# Patient Record
Sex: MALE | Race: WHITE | NOT HISPANIC OR LATINO | Employment: FULL TIME | URBAN - METROPOLITAN AREA
[De-identification: names, ages, dates, MRNs, and addresses within clinical notes are randomized per-mention and may not be internally consistent; named-entity substitution may affect disease eponyms.]

---

## 2019-10-14 ENCOUNTER — TELEPHONE (OUTPATIENT)
Dept: FAMILY MEDICINE CLINIC | Facility: CLINIC | Age: 49
End: 2019-10-14

## 2019-10-14 NOTE — TELEPHONE ENCOUNTER
He will have to be seen to get any kind of referral since it is been over 3 years  He does not have to be booked as a new patient, we just need to find him an appropriate slot and get him seen

## 2019-10-15 NOTE — TELEPHONE ENCOUNTER
Left detailed vm for pt that he needs an appointment for any referrals since he hasn't been here in over 3 years. Scheduling number given to schedule.

## 2019-11-17 ENCOUNTER — HOSPITAL ENCOUNTER (EMERGENCY)
Facility: HOSPITAL | Age: 49
Discharge: HOME | End: 2019-11-17
Attending: EMERGENCY MEDICINE | Admitting: EMERGENCY MEDICINE

## 2019-11-17 VITALS
BODY MASS INDEX: 34.84 KG/M2 | DIASTOLIC BLOOD PRESSURE: 106 MMHG | OXYGEN SATURATION: 98 % | HEIGHT: 67 IN | WEIGHT: 222 LBS | RESPIRATION RATE: 16 BRPM | HEART RATE: 84 BPM | SYSTOLIC BLOOD PRESSURE: 172 MMHG | TEMPERATURE: 98.2 F

## 2019-11-17 DIAGNOSIS — I10 ESSENTIAL HYPERTENSION: ICD-10-CM

## 2019-11-17 DIAGNOSIS — M70.22 OLECRANON BURSITIS OF LEFT ELBOW: Primary | ICD-10-CM

## 2019-11-17 PROCEDURE — 87070 CULTURE OTHR SPECIMN AEROBIC: CPT | Performed by: EMERGENCY MEDICINE

## 2019-11-17 PROCEDURE — 99283 EMERGENCY DEPT VISIT LOW MDM: CPT

## 2019-11-17 PROCEDURE — 25010000003 LIDOCAINE 1 % SOLUTION

## 2019-11-17 PROCEDURE — 87205 SMEAR GRAM STAIN: CPT | Performed by: EMERGENCY MEDICINE

## 2019-11-17 RX ORDER — CEPHALEXIN 500 MG/1
500 CAPSULE ORAL EVERY 8 HOURS SCHEDULED
Status: DISCONTINUED | OUTPATIENT
Start: 2019-11-17 | End: 2019-11-18 | Stop reason: HOSPADM

## 2019-11-17 RX ORDER — LISINOPRIL 5 MG/1
10 TABLET ORAL
Status: COMPLETED | OUTPATIENT
Start: 2019-11-17 | End: 2019-11-17

## 2019-11-17 RX ORDER — LISINOPRIL 10 MG/1
10 TABLET ORAL DAILY
Qty: 10 TABLET | Refills: 0 | Status: SHIPPED | OUTPATIENT
Start: 2019-11-17 | End: 2019-11-27

## 2019-11-17 RX ADMIN — CEPHALEXIN 500 MG: 500 CAPSULE ORAL at 22:36

## 2019-11-17 RX ADMIN — LISINOPRIL 10 MG: 5 TABLET ORAL at 22:36

## 2019-11-18 NOTE — DISCHARGE INSTRUCTIONS
Lisinopril daily every morning.  Keflex 3 times a day for  7 days.  Keep elbow wrapped with a compression dressing this week and no strenuous activity involving the elbow.  Follow-up with your doctor on Friday as planned.  You will be notified if your cultures are positive

## 2019-11-18 NOTE — ED PROVIDER NOTES
Subjective   History of Present Illness  Patient developed pain and swelling over the left olecranon bursa approximately 6 hours ago.  He states that it is gradually increased.  He is not able to recall any direct blow to the elbow but does recall a torquing type of action.  Denies any fever or chills.  Review of Systems    Past Medical History:   Diagnosis Date   • Hyperlipidemia        No Known Allergies    Past Surgical History:   Procedure Laterality Date   • HERNIA REPAIR         History reviewed. No pertinent family history.    Social History     Socioeconomic History   • Marital status:      Spouse name: Not on file   • Number of children: Not on file   • Years of education: Not on file   • Highest education level: Not on file   Tobacco Use   • Smoking status: Former Smoker   • Smokeless tobacco: Never Used   • Tobacco comment: quit 2003   Substance and Sexual Activity   • Alcohol use: Yes     Comment: social   • Drug use: No           Objective   Physical Exam  There is no fever the patient has swelling of the olecranon bursa it is not red but it is tender and there is palpable fluid in the olecranon bursa.  Procedures     The patient had the olecranon bursa cleansed and prepped with Betadine.  The patient had a small amount of Xylocaine placed into the skin with a 27-gauge needle and then the olecranon bursa was aspirated using a 19-gauge needle and 2 cc of straw-colored fluid was removed.  This was sent for Gram stain.  Gram stain showed few gram-positive cocci      ED Course  ED Course as of Nov 17 2240   Sun Nov 17, 2019 2239 Gram Stain: Few (2+) Gram positive diplococci [RG]      ED Course User Index  [RG] Amandeep Cruz MD                  [facility]  It was elected to start the patient on Keflex with the possibility of this being an early septic bursitis.  The patient had a dressing placed over the puncture site and then an Ace wrap was applied.  He is to keep the area wrapped and is to  minimize any activity using the left elbow this week.  He will need to follow-up with his doctor and if his cultures turn out positive then he will be notified.  The patient's blood pressure was rechecked and it was 178/103.  The patient will be started on lisinopril and he has an appointment to see his doctor this coming week.  The patient was also given a 2-day supply of Norco for pain as needed.  Because of his elevated blood pressure chose not to use anti-inflammatories.  Final diagnoses:   Olecranon bursitis of left elbow   Essential hypertension              Amandeep Cruz MD  11/17/19 2239       Amandeep Cruz MD  11/17/19 5295

## 2019-11-22 LAB
BACTERIA SPEC AEROBE CULT: NORMAL
GRAM STN SPEC: NORMAL

## 2021-03-22 ENCOUNTER — OFFICE (AMBULATORY)
Dept: URBAN - METROPOLITAN AREA PATHOLOGY 4 | Facility: PATHOLOGY | Age: 51
End: 2021-03-22
Payer: COMMERCIAL

## 2021-03-22 ENCOUNTER — ON CAMPUS - OUTPATIENT (AMBULATORY)
Dept: URBAN - METROPOLITAN AREA HOSPITAL 2 | Facility: HOSPITAL | Age: 51
End: 2021-03-22
Payer: COMMERCIAL

## 2021-03-22 VITALS
HEART RATE: 67 BPM | OXYGEN SATURATION: 99 % | WEIGHT: 201 LBS | TEMPERATURE: 96.9 F | SYSTOLIC BLOOD PRESSURE: 125 MMHG | SYSTOLIC BLOOD PRESSURE: 131 MMHG | OXYGEN SATURATION: 96 % | SYSTOLIC BLOOD PRESSURE: 111 MMHG | DIASTOLIC BLOOD PRESSURE: 78 MMHG | SYSTOLIC BLOOD PRESSURE: 106 MMHG | HEART RATE: 73 BPM | RESPIRATION RATE: 18 BRPM | SYSTOLIC BLOOD PRESSURE: 135 MMHG | OXYGEN SATURATION: 100 % | DIASTOLIC BLOOD PRESSURE: 69 MMHG | SYSTOLIC BLOOD PRESSURE: 122 MMHG | DIASTOLIC BLOOD PRESSURE: 61 MMHG | HEIGHT: 67 IN | DIASTOLIC BLOOD PRESSURE: 65 MMHG | SYSTOLIC BLOOD PRESSURE: 119 MMHG | HEART RATE: 77 BPM | DIASTOLIC BLOOD PRESSURE: 62 MMHG | RESPIRATION RATE: 16 BRPM | SYSTOLIC BLOOD PRESSURE: 113 MMHG | HEART RATE: 75 BPM | HEART RATE: 63 BPM | OXYGEN SATURATION: 97 % | DIASTOLIC BLOOD PRESSURE: 75 MMHG | HEART RATE: 68 BPM | DIASTOLIC BLOOD PRESSURE: 77 MMHG | OXYGEN SATURATION: 98 % | HEART RATE: 65 BPM

## 2021-03-22 DIAGNOSIS — D12.2 BENIGN NEOPLASM OF ASCENDING COLON: ICD-10-CM

## 2021-03-22 DIAGNOSIS — K63.5 POLYP OF COLON: ICD-10-CM

## 2021-03-22 DIAGNOSIS — Z86.010 PERSONAL HISTORY OF COLONIC POLYPS: ICD-10-CM

## 2021-03-22 PROBLEM — Z12.11 SCREENING FOR MALIGNANT NEOPLASMS OF COLON: Status: ACTIVE | Noted: 2021-03-22

## 2021-03-22 LAB
GI HISTOLOGY: A. UNSPECIFIED: (no result)
GI HISTOLOGY: B. UNSPECIFIED: (no result)
GI HISTOLOGY: PDF REPORT: (no result)

## 2021-03-22 PROCEDURE — 45385 COLONOSCOPY W/LESION REMOVAL: CPT | Mod: 33 | Performed by: INTERNAL MEDICINE

## 2021-03-22 PROCEDURE — 88305 TISSUE EXAM BY PATHOLOGIST: CPT | Mod: 33 | Performed by: INTERNAL MEDICINE

## 2024-01-11 ENCOUNTER — APPOINTMENT (OUTPATIENT)
Dept: GENERAL RADIOLOGY | Facility: HOSPITAL | Age: 54
End: 2024-01-11
Payer: COMMERCIAL

## 2024-01-11 ENCOUNTER — HOSPITAL ENCOUNTER (OUTPATIENT)
Facility: HOSPITAL | Age: 54
Setting detail: OBSERVATION
Discharge: HOME | End: 2024-01-12
Attending: EMERGENCY MEDICINE | Admitting: EMERGENCY MEDICINE
Payer: COMMERCIAL

## 2024-01-11 DIAGNOSIS — R07.9 CHEST PAIN, UNSPECIFIED TYPE: Primary | ICD-10-CM

## 2024-01-11 LAB
ALBUMIN SERPL-MCNC: 4.7 G/DL (ref 3.5–5.2)
ALBUMIN/GLOB SERPL: 1.7 G/DL
ALP SERPL-CCNC: 63 U/L (ref 39–117)
ALT SERPL W P-5'-P-CCNC: 48 U/L (ref 1–41)
ANION GAP SERPL CALCULATED.3IONS-SCNC: 11 MMOL/L (ref 5–15)
AST SERPL-CCNC: 35 U/L (ref 1–40)
BASOPHILS # BLD AUTO: 0 10*3/MM3 (ref 0–0.2)
BASOPHILS NFR BLD AUTO: 1 % (ref 0–1.5)
BILIRUB SERPL-MCNC: 0.6 MG/DL (ref 0–1.2)
BUN SERPL-MCNC: 16 MG/DL (ref 6–20)
BUN/CREAT SERPL: 16.3 (ref 7–25)
CALCIUM SPEC-SCNC: 9.5 MG/DL (ref 8.6–10.5)
CHLORIDE SERPL-SCNC: 103 MMOL/L (ref 98–107)
CHOLEST SERPL-MCNC: 130 MG/DL (ref 0–200)
CO2 SERPL-SCNC: 23 MMOL/L (ref 22–29)
CREAT SERPL-MCNC: 0.98 MG/DL (ref 0.76–1.27)
DEPRECATED RDW RBC AUTO: 41.1 FL (ref 37–54)
EGFRCR SERPLBLD CKD-EPI 2021: 91.1 ML/MIN/1.73
EOSINOPHIL # BLD AUTO: 0.3 10*3/MM3 (ref 0–0.4)
EOSINOPHIL NFR BLD AUTO: 5.5 % (ref 0.3–6.2)
ERYTHROCYTE [DISTWIDTH] IN BLOOD BY AUTOMATED COUNT: 12.9 % (ref 12.3–15.4)
GEN 5 2HR TROPONIN T REFLEX: 13 NG/L
GLOBULIN UR ELPH-MCNC: 2.7 GM/DL
GLUCOSE SERPL-MCNC: 102 MG/DL (ref 65–99)
HCT VFR BLD AUTO: 43.2 % (ref 37.5–51)
HDLC SERPL-MCNC: 34 MG/DL (ref 40–60)
HGB BLD-MCNC: 14.8 G/DL (ref 13–17.7)
LDLC SERPL CALC-MCNC: 78 MG/DL (ref 0–100)
LDLC/HDLC SERPL: 2.28 {RATIO}
LYMPHOCYTES # BLD AUTO: 1.8 10*3/MM3 (ref 0.7–3.1)
LYMPHOCYTES NFR BLD AUTO: 39.1 % (ref 19.6–45.3)
MCH RBC QN AUTO: 31.9 PG (ref 26.6–33)
MCHC RBC AUTO-ENTMCNC: 34.3 G/DL (ref 31.5–35.7)
MCV RBC AUTO: 92.8 FL (ref 79–97)
MONOCYTES # BLD AUTO: 0.5 10*3/MM3 (ref 0.1–0.9)
MONOCYTES NFR BLD AUTO: 9.8 % (ref 5–12)
NEUTROPHILS NFR BLD AUTO: 2.1 10*3/MM3 (ref 1.7–7)
NEUTROPHILS NFR BLD AUTO: 44.6 % (ref 42.7–76)
NRBC BLD AUTO-RTO: 0 /100 WBC (ref 0–0.2)
PLATELET # BLD AUTO: 271 10*3/MM3 (ref 140–450)
PMV BLD AUTO: 7.8 FL (ref 6–12)
POTASSIUM SERPL-SCNC: 3.8 MMOL/L (ref 3.5–5.2)
PROT SERPL-MCNC: 7.4 G/DL (ref 6–8.5)
RBC # BLD AUTO: 4.66 10*6/MM3 (ref 4.14–5.8)
SODIUM SERPL-SCNC: 137 MMOL/L (ref 136–145)
TRIGL SERPL-MCNC: 93 MG/DL (ref 0–150)
TROPONIN T DELTA: 0 NG/L
TROPONIN T SERPL HS-MCNC: 13 NG/L
VLDLC SERPL-MCNC: 18 MG/DL (ref 5–40)
WBC NRBC COR # BLD AUTO: 4.7 10*3/MM3 (ref 3.4–10.8)

## 2024-01-11 PROCEDURE — 96372 THER/PROPH/DIAG INJ SC/IM: CPT

## 2024-01-11 PROCEDURE — 71046 X-RAY EXAM CHEST 2 VIEWS: CPT

## 2024-01-11 PROCEDURE — G0378 HOSPITAL OBSERVATION PER HR: HCPCS

## 2024-01-11 PROCEDURE — 99284 EMERGENCY DEPT VISIT MOD MDM: CPT

## 2024-01-11 PROCEDURE — 80061 LIPID PANEL: CPT | Performed by: PHYSICIAN ASSISTANT

## 2024-01-11 PROCEDURE — 25010000002 ENOXAPARIN PER 10 MG: Performed by: PHYSICIAN ASSISTANT

## 2024-01-11 PROCEDURE — 80053 COMPREHEN METABOLIC PANEL: CPT | Performed by: EMERGENCY MEDICINE

## 2024-01-11 PROCEDURE — 84484 ASSAY OF TROPONIN QUANT: CPT | Performed by: EMERGENCY MEDICINE

## 2024-01-11 PROCEDURE — 36415 COLL VENOUS BLD VENIPUNCTURE: CPT

## 2024-01-11 PROCEDURE — 93005 ELECTROCARDIOGRAM TRACING: CPT

## 2024-01-11 PROCEDURE — 93005 ELECTROCARDIOGRAM TRACING: CPT | Performed by: EMERGENCY MEDICINE

## 2024-01-11 PROCEDURE — 85025 COMPLETE CBC W/AUTO DIFF WBC: CPT | Performed by: EMERGENCY MEDICINE

## 2024-01-11 RX ORDER — BISACODYL 5 MG/1
5 TABLET, DELAYED RELEASE ORAL DAILY PRN
Status: DISCONTINUED | OUTPATIENT
Start: 2024-01-11 | End: 2024-01-12 | Stop reason: HOSPADM

## 2024-01-11 RX ORDER — ATORVASTATIN CALCIUM 20 MG/1
20 TABLET, FILM COATED ORAL NIGHTLY
Status: DISCONTINUED | OUTPATIENT
Start: 2024-01-11 | End: 2024-01-12 | Stop reason: HOSPADM

## 2024-01-11 RX ORDER — NITROGLYCERIN 0.4 MG/1
0.4 TABLET SUBLINGUAL
Status: DISCONTINUED | OUTPATIENT
Start: 2024-01-11 | End: 2024-01-12 | Stop reason: HOSPADM

## 2024-01-11 RX ORDER — ALUMINA, MAGNESIA, AND SIMETHICONE 2400; 2400; 240 MG/30ML; MG/30ML; MG/30ML
15 SUSPENSION ORAL EVERY 6 HOURS PRN
Status: DISCONTINUED | OUTPATIENT
Start: 2024-01-11 | End: 2024-01-12 | Stop reason: HOSPADM

## 2024-01-11 RX ORDER — ASPIRIN 325 MG
325 TABLET ORAL ONCE
Status: COMPLETED | OUTPATIENT
Start: 2024-01-11 | End: 2024-01-11

## 2024-01-11 RX ORDER — CHOLECALCIFEROL (VITAMIN D3) 125 MCG
5 CAPSULE ORAL NIGHTLY PRN
Status: DISCONTINUED | OUTPATIENT
Start: 2024-01-11 | End: 2024-01-12 | Stop reason: HOSPADM

## 2024-01-11 RX ORDER — ACETAMINOPHEN 160 MG/5ML
650 SOLUTION ORAL EVERY 4 HOURS PRN
Status: DISCONTINUED | OUTPATIENT
Start: 2024-01-11 | End: 2024-01-12 | Stop reason: HOSPADM

## 2024-01-11 RX ORDER — LISINOPRIL 20 MG/1
20 TABLET ORAL DAILY
Status: DISCONTINUED | OUTPATIENT
Start: 2024-01-12 | End: 2024-01-12 | Stop reason: HOSPADM

## 2024-01-11 RX ORDER — AMOXICILLIN 250 MG
2 CAPSULE ORAL 2 TIMES DAILY
Status: DISCONTINUED | OUTPATIENT
Start: 2024-01-11 | End: 2024-01-12 | Stop reason: HOSPADM

## 2024-01-11 RX ORDER — ONDANSETRON 4 MG/1
4 TABLET, ORALLY DISINTEGRATING ORAL EVERY 6 HOURS PRN
Status: DISCONTINUED | OUTPATIENT
Start: 2024-01-11 | End: 2024-01-12 | Stop reason: HOSPADM

## 2024-01-11 RX ORDER — SODIUM CHLORIDE 0.9 % (FLUSH) 0.9 %
10 SYRINGE (ML) INJECTION AS NEEDED
Status: DISCONTINUED | OUTPATIENT
Start: 2024-01-11 | End: 2024-01-12 | Stop reason: HOSPADM

## 2024-01-11 RX ORDER — SODIUM CHLORIDE 9 MG/ML
40 INJECTION, SOLUTION INTRAVENOUS AS NEEDED
Status: DISCONTINUED | OUTPATIENT
Start: 2024-01-11 | End: 2024-01-12 | Stop reason: HOSPADM

## 2024-01-11 RX ORDER — ACETAMINOPHEN 650 MG/1
650 SUPPOSITORY RECTAL EVERY 4 HOURS PRN
Status: DISCONTINUED | OUTPATIENT
Start: 2024-01-11 | End: 2024-01-12 | Stop reason: HOSPADM

## 2024-01-11 RX ORDER — ASPIRIN 81 MG/1
81 TABLET, CHEWABLE ORAL DAILY
Status: DISCONTINUED | OUTPATIENT
Start: 2024-01-12 | End: 2024-01-12 | Stop reason: HOSPADM

## 2024-01-11 RX ORDER — ONDANSETRON 2 MG/ML
4 INJECTION INTRAMUSCULAR; INTRAVENOUS EVERY 6 HOURS PRN
Status: DISCONTINUED | OUTPATIENT
Start: 2024-01-11 | End: 2024-01-12 | Stop reason: HOSPADM

## 2024-01-11 RX ORDER — ENOXAPARIN SODIUM 100 MG/ML
40 INJECTION SUBCUTANEOUS
Status: DISCONTINUED | OUTPATIENT
Start: 2024-01-11 | End: 2024-01-12 | Stop reason: HOSPADM

## 2024-01-11 RX ORDER — TAMSULOSIN HYDROCHLORIDE 0.4 MG/1
1 CAPSULE ORAL
COMMUNITY

## 2024-01-11 RX ORDER — FENOFIBRATE 145 MG/1
145 TABLET, COATED ORAL DAILY
Status: DISCONTINUED | OUTPATIENT
Start: 2024-01-12 | End: 2024-01-12 | Stop reason: HOSPADM

## 2024-01-11 RX ORDER — ACETAMINOPHEN 325 MG/1
650 TABLET ORAL EVERY 4 HOURS PRN
Status: DISCONTINUED | OUTPATIENT
Start: 2024-01-11 | End: 2024-01-12 | Stop reason: HOSPADM

## 2024-01-11 RX ORDER — TAMSULOSIN HYDROCHLORIDE 0.4 MG/1
0.4 CAPSULE ORAL DAILY
Status: DISCONTINUED | OUTPATIENT
Start: 2024-01-12 | End: 2024-01-12 | Stop reason: HOSPADM

## 2024-01-11 RX ORDER — BISACODYL 10 MG
10 SUPPOSITORY, RECTAL RECTAL DAILY PRN
Status: DISCONTINUED | OUTPATIENT
Start: 2024-01-11 | End: 2024-01-12 | Stop reason: HOSPADM

## 2024-01-11 RX ORDER — SODIUM CHLORIDE 0.9 % (FLUSH) 0.9 %
10 SYRINGE (ML) INJECTION EVERY 12 HOURS SCHEDULED
Status: DISCONTINUED | OUTPATIENT
Start: 2024-01-11 | End: 2024-01-12 | Stop reason: HOSPADM

## 2024-01-11 RX ORDER — POLYETHYLENE GLYCOL 3350 17 G/17G
17 POWDER, FOR SOLUTION ORAL DAILY PRN
Status: DISCONTINUED | OUTPATIENT
Start: 2024-01-11 | End: 2024-01-12 | Stop reason: HOSPADM

## 2024-01-11 RX ADMIN — Medication 10 ML: at 21:37

## 2024-01-11 RX ADMIN — ASPIRIN 325 MG ORAL TABLET 325 MG: 325 PILL ORAL at 13:40

## 2024-01-11 RX ADMIN — ENOXAPARIN SODIUM 40 MG: 100 INJECTION SUBCUTANEOUS at 17:16

## 2024-01-11 NOTE — H&P
"[facility] H&P    Patient Name: Pedro Meyers  : 1970  MRN: [id number]  Primary Care Physician: Michele Alex MD  Date of admission: 2024     Patient Care Team:  Michele Alex MD as PCP - General          Subjective   History Present Illness     Chief Complaint:   Chief Complaint   Patient presents with    Chest Pain     Chest pain started at 1130,  feels like something is sitting on his chest. Soa,  Pt took 3 ibuprofen at 1140 helped some with the pain.     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  States it is mild there is no neck arm jaw pain or shortness of breath dizziness sweating.  Nothing really seems to make it better or worse.  He denies any recent long car ride plane ride immobilization for travels or leg pain or swelling no recent illness flus viruses or vaccinations.  No history of heart disease and no early family history of heart disease.  No history of sudden death or clotting disorders in the family.     2024 (postobservation admission):  Patient confirms the HPI noted above including some initial momentary episodes of sharp chest pain located on the left side of his chest which seem to be related to some musculoskeletal movements though were not reproducible with repetitive motions and lasted only seconds before resolving spontaneously.  Patient initially took some ibuprofen however subsequently developed a more constant nagging left-sided chest pressure.  He notes that the pain would \"catch his breath\" when it was sharp and severe initially but notes no other dyspnea, palpitations, diaphoresis, peripheral edema, cough or fever.  Family history is notable for both maternal " and paternal grandfathers who had CAD.        Review of Systems   Constitutional: Negative.   HENT: Negative.     Eyes: Negative.    Cardiovascular:  Positive for chest pain.   Respiratory: Negative.     Skin: Negative.    Musculoskeletal: Negative.    Gastrointestinal: Negative.    Genitourinary: Negative.    Neurological: Negative.    Psychiatric/Behavioral: Negative.             Personal History     Past Medical History:   Past Medical History:   Diagnosis Date    Hyperlipidemia        Surgical History:      Past Surgical History:   Procedure Laterality Date    HERNIA REPAIR             Family History: family history is not on file. Otherwise pertinent FHx was reviewed and unremarkable.     Social History:  reports that he has quit smoking. He has never used smokeless tobacco. He reports current alcohol use. He reports that he does not use drugs.      Medications:  Prior to Admission medications    Medication Sig Start Date End Date Taking? Authorizing Provider   atorvastatin (LIPITOR) 20 MG tablet Take 1 tablet by mouth Every Night. 1/6/21  Yes Emergency, Nurse SCOT Yu   fenofibrate (TRICOR) 145 MG tablet Take 1 tablet by mouth Daily. 3/2/21  Yes Emergency, Nurse Aimee RN   lisinopril (PRINIVIL,ZESTRIL) 20 MG tablet Take 1 tablet by mouth Daily. 3/2/21  Yes Emergency, Nurse Aimee RN   tamsulosin (FLOMAX) 0.4 MG capsule 24 hr capsule Take 1 capsule by mouth Daily.   Yes Provider, MD Sharon   cyclobenzaprine (FLEXERIL) 10 MG tablet 1 tablet p.o. every 8 hours as needed 1/20/23 1/11/24  Earnest Clancy MD   etodolac (LODINE) 400 MG tablet 1 tablet p.o. twice daily with food 1/20/23 1/11/24  Earnest Clancy MD       Allergies:  No Known Allergies    Objective   Objective     Vital Signs  Temp:  [98.4 °F (36.9 °C)] 98.4 °F (36.9 °C)  Heart Rate:  [65-74] 66  Resp:  [14-20] 20  BP: (128-152)/(82-99) 138/93  SpO2:  [93 %-97 %] 96 %  on   ;   Device (Oxygen Therapy): room air  Body mass index is  33.15 kg/m².    Physical Exam  Vitals reviewed.   Constitutional:       General: He is not in acute distress.     Appearance: Normal appearance. He is obese. He is not ill-appearing, toxic-appearing or diaphoretic.   HENT:      Head: Normocephalic.      Right Ear: External ear normal.      Left Ear: External ear normal.      Nose: Nose normal.      Mouth/Throat:      Mouth: Mucous membranes are moist.   Eyes:      Extraocular Movements: Extraocular movements intact.   Cardiovascular:      Rate and Rhythm: Normal rate and regular rhythm.      Pulses: Normal pulses.      Heart sounds: Normal heart sounds.   Pulmonary:      Effort: Pulmonary effort is normal.      Breath sounds: Normal breath sounds.   Abdominal:      General: Bowel sounds are normal.      Palpations: Abdomen is soft.      Tenderness: There is no abdominal tenderness.   Musculoskeletal:         General: Normal range of motion.      Cervical back: Normal range of motion.      Right lower leg: No edema.      Left lower leg: No edema.   Skin:     General: Skin is warm and dry.      Capillary Refill: Capillary refill takes less than 2 seconds.   Neurological:      General: No focal deficit present.      Mental Status: He is alert and oriented to person, place, and time.   Psychiatric:         Mood and Affect: Mood normal.         Behavior: Behavior normal.         Thought Content: Thought content normal.         Judgment: Judgment normal.           Results Review:  I have personally reviewed most recent cardiac tracings and agree with findings, most notably: Troponin, CBC, CMP, chest x-ray and EKG.    Results from last 7 days   Lab Units 01/11/24  1336   WBC 10*3/mm3 4.70   HEMOGLOBIN g/dL 14.8   HEMATOCRIT % 43.2   PLATELETS 10*3/mm3 271     Results from last 7 days   Lab Units 01/11/24  1537 01/11/24  1336   SODIUM mmol/L  --  137   POTASSIUM mmol/L  --  3.8   CHLORIDE mmol/L  --  103   CO2 mmol/L  --  23.0   BUN mg/dL  --  16   CREATININE mg/dL  --   0.98   GLUCOSE mg/dL  --  102*   CALCIUM mg/dL  --  9.5   ALK PHOS U/L  --  63   ALT (SGPT) U/L  --  48*   AST (SGOT) U/L  --  35   HSTROP T ng/L 13 13     Estimated Creatinine Clearance: 97.1 mL/min (by C-G formula based on SCr of 0.98 mg/dL).  Brief Urine Lab Results       None            Microbiology Results (last 10 days)       ** No results found for the last 240 hours. **            ECG/EMG Results (most recent)       Procedure Component Value Units Date/Time    ECG 12 Lead Chest Pain [[id number]] Collected: 01/11/24 1308     Updated: 01/11/24 1309     QT Interval 372 ms      QTC Interval 423 ms     Narrative:      HEART RATE= 78  bpm  RR Interval= 772  ms  ID Interval= 192  ms  P Horizontal Axis= -6  deg  P Front Axis= 38  deg  QRSD Interval= 104  ms  QT Interval= 372  ms  QTcB= 423  ms  QRS Axis= -19  deg  T Wave Axis= 37  deg  - ABNORMAL ECG -  Sinus rhythm  Probable anteroseptal infarct, old  No previous ECG available for comparison  Electronically Signed By:   Date and Time of Study: 2024-01-11 13:08:21                    XR Chest 2 View    Result Date: 1/11/2024  Impression: Poor inspiration. No acute process. Electronically Signed: Analisa Hannon MD  1/11/2024 2:08 PM EST  Workstation ID: [id number]       Estimated Creatinine Clearance: 97.1 mL/min (by C-G formula based on SCr of 0.98 mg/dL).    Assessment & Plan   Assessment/Plan       Active Hospital Problems    Diagnosis  POA    **Chest pain [R07.9]  Yes      Resolved Hospital Problems   No resolved problems to display.       Chest pain        Lab Results   Component Value Date     TROPONINT 13 01/11/2024     TROPONINT 13 01/11/2024   -Lipid panel ordered  -Chest X-ray: Showed no acute process but with poor inspiration noted  -EKG: Sinus rhythm at 78 without obvious acute changes or ectopy with a QTc of 423 ms  -In the ED pt given 325 mg aspirin  -Stress Test and echocardiogram ordered  -Telemetry  -Continue statin     Hypertension  -Well controlled        BP Readings from Last 1 Encounters:   01/11/24 138/93   - Continue lisinopril  - Monitor while admitted     Hyperlipidemia  -Statin     BPH  -Flomax          VTE Prophylaxis -   Mechanical Order History:       None          Pharmalogical Order History:        Ordered     Dose Route Frequency Stop    Signed and Held  Enoxaparin Sodium (LOVENOX) syringe 40 mg         40 mg SC Every 24 Hours --                    CODE STATUS:    Code Status and Medical Interventions:   Ordered at: 01/11/24 1618     Code Status (Patient has no pulse and is not breathing):    CPR (Attempt to Resuscitate)     Medical Interventions (Patient has pulse or is breathing):    Full Support       This patient has been examined wearing personal protective equipment.     I discussed the patient's findings and my recommendations with patient and nursing staff.      Signature:Electronically signed by Antonio Jorgensen PA-C, 01/11/24, 5:06 PM EST.

## 2024-01-11 NOTE — ED NOTES
Nursing report ED to floor  Pedro Meyers  53 y.o.  male    HPI:   Chief Complaint   Patient presents with    Chest Pain     Chest pain started at 1130,  feels like something is sitting on his chest. Soa,  Pt took 3 ibuprofen at 1140 helped some with the pain.       Admitting doctor:   Umair Reynolds MD    Admitting diagnosis:   The encounter diagnosis was Chest pain, unspecified type.    Code status:   Current Code Status       Date Active Code Status Order ID Comments User Context       1/11/2024 1618 CPR (Attempt to Resuscitate) [id number]  Antonio Jorgensen PA-C ED        Question Answer    Code Status (Patient has no pulse and is not breathing) CPR (Attempt to Resuscitate)    Medical Interventions (Patient has pulse or is breathing) Full Support                    Allergies:   Patient has no known allergies.    Isolation:  No active isolations     Fall Risk:  Fall Risk Assessment was completed, and patient is at low risk for falls.   Predictive Model Details         4 (Low) Factor Value    Calculated 1/11/2024 16:37 Age 53    Risk of Fall Model Musculoskeletal Assessment WDL     Active Peripheral IV Present     Imaging order in this encounter Present     Respiratory Rate 20     Skin Assessment WDL     Magnesium not on file     Drug Use No     Tobacco Use Quit     Dave Scale not on file     Financial Class Private Insurance     Peripheral Vascular Assessment WDL     Clinically Relevant Sex Not Female     Gastrointestinal Assessment WDL     Number of Distinct Medication Classes administered 1     ALT 48 U/L     Total Bilirubin 0.6 mg/dL     Cardiac Assessment WDL     Diastolic BP 93     Chloride 103 mmol/L     Days after Admission 0.139     Calcium 9.5 mg/dL     Potassium 3.8 mmol/L     Albumin 4.7 g/dL     Creatinine 0.98 mg/dL         Weight:       01/11/24  1302   Weight: 98.9 kg (218 lb)       Intake and Output  No intake or output data in the 24 hours ending 01/11/24 1637    Diet:        Most recent  vitals:   Vitals:    01/11/24 1408 01/11/24 1444 01/11/24 1532 01/11/24 1624   BP: 130/82 128/85 138/99 138/93   BP Location:   Right arm    Patient Position: Lying Lying Lying Lying   Pulse: 65 65 74 66   Resp: 18 14 14 20   Temp:       TempSrc:       SpO2: 93% 95% 97% 96%   Weight:       Height:           Active LDAs/IV Access:   Lines, Drains & Airways       Active LDAs       Name Placement date Placement time Site Days    Peripheral IV 01/11/24 1336 Posterior;Right Hand 01/11/24  1336  Hand  less than 1                    Skin Condition:   Skin Assessments (last day)       None             Labs (abnormal labs have a star):   Labs Reviewed   COMPREHENSIVE METABOLIC PANEL - Abnormal; Notable for the following components:       Result Value    Glucose 102 (*)     ALT (SGPT) 48 (*)     All other components within normal limits    Narrative:     GFR Normal >60  Chronic Kidney Disease <60  Kidney Failure <15     TROPONIN - Normal    Narrative:     High Sensitive Troponin T Reference Range:  <14.0 ng/L- Negative Female for AMI  <22.0 ng/L- Negative Male for AMI  >=14 - Abnormal Female indicating possible myocardial injury.  >=22 - Abnormal Male indicating possible myocardial injury.   Clinicians would have to utilize clinical acumen, EKG, Troponin, and serial changes to determine if it is an Acute Myocardial Infarction or myocardial injury due to an underlying chronic condition.        CBC WITH AUTO DIFFERENTIAL - Normal   HIGH SENSITIVITIY TROPONIN T 2HR - Normal    Narrative:     High Sensitive Troponin T Reference Range:  <14.0 ng/L- Negative Female for AMI  <22.0 ng/L- Negative Male for AMI  >=14 - Abnormal Female indicating possible myocardial injury.  >=22 - Abnormal Male indicating possible myocardial injury.   Clinicians would have to utilize clinical acumen, EKG, Troponin, and serial changes to determine if it is an Acute Myocardial Infarction or myocardial injury due to an underlying chronic condition.         CBC AND DIFFERENTIAL    Narrative:     The following orders were created for panel order CBC & Differential.  Procedure                               Abnormality         Status                     ---------                               -----------         ------                     CBC Auto Differential[[id number]]        Normal              Final result                 Please view results for these tests on the individual orders.       LOC: Person, Place, Time, and Situation    Telemetry:  Observation Unit    Cardiac Monitoring Ordered: yes    EKG:   ECG 12 Lead Chest Pain   Preliminary Result   HEART RATE= 78  bpm   RR Interval= 772  ms   FL Interval= 192  ms   P Horizontal Axis= -6  deg   P Front Axis= 38  deg   QRSD Interval= 104  ms   QT Interval= 372  ms   QTcB= 423  ms   QRS Axis= -19  deg   T Wave Axis= 37  deg   - ABNORMAL ECG -   Sinus rhythm   Probable anteroseptal infarct, old   No previous ECG available for comparison   Electronically Signed By:    Date and Time of Study: 2024-01-11 13:08:21          Medications Given in the ED:   Medications   sodium chloride 0.9 % flush 10 mL (has no administration in time range)   aspirin tablet 325 mg (325 mg Oral Given 1/11/24 1340)       Imaging results:  XR Chest 2 View    Result Date: 1/11/2024  Impression: Poor inspiration. No acute process. Electronically Signed: Analisa Hannon MD  1/11/2024 2:08 PM EST  Workstation ID: [id number]     Social issues:   Social History     Socioeconomic History    Marital status:    Tobacco Use    Smoking status: Former    Smokeless tobacco: Never    Tobacco comments:     quit 2003   Substance and Sexual Activity    Alcohol use: Yes     Comment: social    Drug use: No       NIH Stroke Scale:  Interval: (not recorded)  1a. Level of Consciousness: (not recorded)  1b. LOC Questions: (not recorded)  1c. LOC Commands: (not recorded)  2. Best Gaze: (not recorded)  3. Visual: (not recorded)  4. Facial Palsy: (not recorded)  5a.  Motor Arm, Left: (not recorded)  5b. Motor Arm, Right: (not recorded)  6a. Motor Leg, Left: (not recorded)  6b. Motor Leg, Right: (not recorded)  7. Limb Ataxia: (not recorded)  8. Sensory: (not recorded)  9. Best Language: (not recorded)  10. Dysarthria: (not recorded)  11. Extinction and Inattention (formerly Neglect): (not recorded)    Total (NIH Stroke Scale): (not recorded)     Additional notable assessment information:     Nursing report ED to floor:  Debbie Cavanaugh RN   01/11/24 16:37 EST

## 2024-01-11 NOTE — ED PROVIDER NOTES
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  States it is mild there is no neck arm jaw pain or shortness of breath dizziness sweating.  Nothing really seems to make it better or worse.  He denies any recent long car ride plane ride immobilization for travels or leg pain or swelling no recent illness flus viruses or vaccinations.  No history of heart disease and no early family history of heart disease.  No history of sudden death or clotting disorders in the family.      Review of Systems   Constitutional:  Negative for chills and fever.   Respiratory:  Positive for chest tightness. Negative for shortness of breath.    Cardiovascular:  Positive for chest pain. Negative for palpitations.   Gastrointestinal:  Positive for vomiting. Negative for abdominal pain.   Musculoskeletal:  Negative for back pain and neck pain.   Neurological:  Negative for dizziness and light-headedness.   Psychiatric/Behavioral:  Negative for confusion.        Past Medical History:   Diagnosis Date    Hyperlipidemia        No Known Allergies    Past Surgical History:   Procedure Laterality Date    HERNIA REPAIR         No family history on file.    Social History     Socioeconomic History    Marital status:    Tobacco Use    Smoking status: Former    Smokeless tobacco: Never    Tobacco comments:     quit 2003   Substance and Sexual Activity    Alcohol use: Yes     Comment: social    Drug use: No     Prior to Admission medications    Medication Sig Start Date End Date Taking? Authorizing Provider   atorvastatin (LIPITOR) 20 MG tablet  1/6/21   Emergency, Nurse Epic, RN   cyclobenzaprine (FLEXERIL) 10 MG tablet 1 tablet p.o. every 8 hours as needed 1/20/23   Earnest Clancy  MD Rosas   etodolac (LODINE) 400 MG tablet 1 tablet p.o. twice daily with food 1/20/23   Earnest Clancy MD   fenofibrate (TRICOR) 145 MG tablet  3/2/21   Emergency, Nurse Aimee, RN   lisinopril (PRINIVIL,ZESTRIL) 20 MG tablet  3/2/21   Emergency, Nurse Epic, RN          Objective   Physical Exam  Constitutional this is a 53-year-old awake alert no acute distress triage vital signs reviewed HEENT extraocular muscles are intact pupils equal round reactive sclera clear neck supple no adenopathy no JV no bruits lungs clear no retraction heart regular without murmur chest wall without tenderness no redness or edema abdomen soft nontender good bowel sounds no peritoneal findings or pulsatile masses extremities pulses equal upper and lower extremities no edema no cords or Homans' sign no evidence of DVT skin warm and dry without rashes or cellulitic changes neurologic awake alert follows commands motor strength normal without focal weakness  Procedures           ED Course      Results for orders placed or performed during the hospital encounter of 01/11/24   Comprehensive Metabolic Panel    Specimen: Blood   Result Value Ref Range    Glucose 102 (H) 65 - 99 mg/dL    BUN 16 6 - 20 mg/dL    Creatinine 0.98 0.76 - 1.27 mg/dL    Sodium 137 136 - 145 mmol/L    Potassium 3.8 3.5 - 5.2 mmol/L    Chloride 103 98 - 107 mmol/L    CO2 23.0 22.0 - 29.0 mmol/L    Calcium 9.5 8.6 - 10.5 mg/dL    Total Protein 7.4 6.0 - 8.5 g/dL    Albumin 4.7 3.5 - 5.2 g/dL    ALT (SGPT) 48 (H) 1 - 41 U/L    AST (SGOT) 35 1 - 40 U/L    Alkaline Phosphatase 63 39 - 117 U/L    Total Bilirubin 0.6 0.0 - 1.2 mg/dL    Globulin 2.7 gm/dL    A/G Ratio 1.7 g/dL    BUN/Creatinine Ratio 16.3 7.0 - 25.0    Anion Gap 11.0 5.0 - 15.0 mmol/L    eGFR 91.1 >60.0 mL/min/1.73   High Sensitivity Troponin T    Specimen: Blood   Result Value Ref Range    HS Troponin T 13 <22 ng/L   CBC Auto Differential    Specimen: Blood   Result Value Ref Range    WBC 4.70 3.40 -  10.80 10*3/mm3    RBC 4.66 4.14 - 5.80 10*6/mm3    Hemoglobin 14.8 13.0 - 17.7 g/dL    Hematocrit 43.2 37.5 - 51.0 %    MCV 92.8 79.0 - 97.0 fL    MCH 31.9 26.6 - 33.0 pg    MCHC 34.3 31.5 - 35.7 g/dL    RDW 12.9 12.3 - 15.4 %    RDW-SD 41.1 37.0 - 54.0 fl    MPV 7.8 6.0 - 12.0 fL    Platelets 271 140 - 450 10*3/mm3    Neutrophil % 44.6 42.7 - 76.0 %    Lymphocyte % 39.1 19.6 - 45.3 %    Monocyte % 9.8 5.0 - 12.0 %    Eosinophil % 5.5 0.3 - 6.2 %    Basophil % 1.0 0.0 - 1.5 %    Neutrophils, Absolute 2.10 1.70 - 7.00 10*3/mm3    Lymphocytes, Absolute 1.80 0.70 - 3.10 10*3/mm3    Monocytes, Absolute 0.50 0.10 - 0.90 10*3/mm3    Eosinophils, Absolute 0.30 0.00 - 0.40 10*3/mm3    Basophils, Absolute 0.00 0.00 - 0.20 10*3/mm3    nRBC 0.0 0.0 - 0.2 /100 WBC   ECG 12 Lead Chest Pain   Result Value Ref Range    QT Interval 372 ms    QTC Interval 423 ms     XR Chest 2 View    Result Date: 1/11/2024  Impression: Poor inspiration. No acute process. Electronically Signed: Analisa Hannon MD  1/11/2024 2:08 PM EST  Workstation ID: [id number]   Medications   sodium chloride 0.9 % flush 10 mL (has no administration in time range)   aspirin tablet 325 mg (325 mg Oral Given 1/11/24 1340)                                    EKG my interpretation normal sinus rhythm rate 78 normal axis no hypertrophy QTc 423 normal EKG          Medical Decision Making  Medical decision making.  IV established monitor placement review of sinus rhythm EKG obtained my independent review shows a normal sinus rhythm rate of 78 normal axis no hypertrophy QTc of 423 was a normal EKG with nothing old to compare with.  Patient given aspirin p.o.  Chest x-ray obtained my independent review reveals no acute pneumonia pneumothorax CHF or acute findings.  Labs obtained my independent review comprehensive metabolic profile CBC and troponin negative.  Repeat troponin currently pending.  Patient repeat exam is resting company no distress.  I do not see evidence of  acute ST elevation myocardial infarction DVT pulm embolism or dissection pericardial effusion pericarditis or myocarditis pneumothorax or infection although not a complete list of all possibilities.  We talked about the findings.  The patient will be placed in the observation unit for serial troponins and stress testing and further workup.  Provider in observation notified and case discussed stable unremarkable ER course.    Problems Addressed:  Chest pain, unspecified type: complicated acute illness or injury    Amount and/or Complexity of Data Reviewed  Labs: ordered. Decision-making details documented in ED Course.  Radiology: ordered and independent interpretation performed. Decision-making details documented in ED Course.  ECG/medicine tests: ordered and independent interpretation performed. Decision-making details documented in ED Course.    Risk  Decision regarding hospitalization.        Final diagnoses:   Chest pain, unspecified type       ED Disposition  ED Disposition       ED Disposition   Decision to Admit    Condition   --    Comment   --               No follow-up provider specified.       Medication List      No changes were made to your prescriptions during this visit.            Umair Reynolds MD  01/11/24 2526

## 2024-01-12 ENCOUNTER — APPOINTMENT (OUTPATIENT)
Dept: NUCLEAR MEDICINE | Facility: HOSPITAL | Age: 54
End: 2024-01-12
Payer: COMMERCIAL

## 2024-01-12 ENCOUNTER — APPOINTMENT (OUTPATIENT)
Dept: CARDIOLOGY | Facility: HOSPITAL | Age: 54
End: 2024-01-12
Payer: COMMERCIAL

## 2024-01-12 VITALS
BODY MASS INDEX: 33.28 KG/M2 | HEART RATE: 73 BPM | OXYGEN SATURATION: 98 % | TEMPERATURE: 97.8 F | HEIGHT: 68 IN | DIASTOLIC BLOOD PRESSURE: 94 MMHG | WEIGHT: 219.58 LBS | RESPIRATION RATE: 16 BRPM | SYSTOLIC BLOOD PRESSURE: 138 MMHG

## 2024-01-12 LAB
ANION GAP SERPL CALCULATED.3IONS-SCNC: 9 MMOL/L (ref 5–15)
BASOPHILS # BLD AUTO: 0 10*3/MM3 (ref 0–0.2)
BASOPHILS NFR BLD AUTO: 1 % (ref 0–1.5)
BH CV ECHO LEFT VENTRICLE GLOBAL LONGITUDINAL STRAIN: -19.2 %
BH CV ECHO MEAS - ACS: 1.9 CM
BH CV ECHO MEAS - AO MAX PG: 6.1 MMHG
BH CV ECHO MEAS - AO MEAN PG: 3 MMHG
BH CV ECHO MEAS - AO ROOT DIAM: 3.8 CM
BH CV ECHO MEAS - AO V2 MAX: 123 CM/SEC
BH CV ECHO MEAS - AO V2 VTI: 24.7 CM
BH CV ECHO MEAS - AVA(I,D): 3.6 CM2
BH CV ECHO MEAS - EDV(CUBED): 110.6 ML
BH CV ECHO MEAS - EDV(MOD-SP2): 76.9 ML
BH CV ECHO MEAS - EDV(MOD-SP4): 90.4 ML
BH CV ECHO MEAS - EF(MOD-BP): 60.6 %
BH CV ECHO MEAS - EF(MOD-SP2): 62.4 %
BH CV ECHO MEAS - EF(MOD-SP4): 59.1 %
BH CV ECHO MEAS - EF_3D-VOL: 62 %
BH CV ECHO MEAS - ESV(CUBED): 27 ML
BH CV ECHO MEAS - ESV(MOD-SP2): 28.9 ML
BH CV ECHO MEAS - ESV(MOD-SP4): 37 ML
BH CV ECHO MEAS - FS: 37.5 %
BH CV ECHO MEAS - IVS/LVPW: 1 CM
BH CV ECHO MEAS - IVSD: 1.2 CM
BH CV ECHO MEAS - LA DIMENSION: 3.5 CM
BH CV ECHO MEAS - LAT PEAK E' VEL: 10.6 CM/SEC
BH CV ECHO MEAS - LV DIASTOLIC VOL/BSA (35-75): 42.6 CM2
BH CV ECHO MEAS - LV MASS(C)D: 219.1 GRAMS
BH CV ECHO MEAS - LV MAX PG: 4.7 MMHG
BH CV ECHO MEAS - LV MEAN PG: 2 MMHG
BH CV ECHO MEAS - LV SYSTOLIC VOL/BSA (12-30): 17.4 CM2
BH CV ECHO MEAS - LV V1 MAX: 108 CM/SEC
BH CV ECHO MEAS - LV V1 VTI: 21.6 CM
BH CV ECHO MEAS - LVIDD: 4.8 CM
BH CV ECHO MEAS - LVIDS: 3 CM
BH CV ECHO MEAS - LVOT AREA: 4.2 CM2
BH CV ECHO MEAS - LVOT DIAM: 2.3 CM
BH CV ECHO MEAS - LVPWD: 1.2 CM
BH CV ECHO MEAS - MED PEAK E' VEL: 10.3 CM/SEC
BH CV ECHO MEAS - MV A MAX VEL: 62.7 CM/SEC
BH CV ECHO MEAS - MV DEC SLOPE: 503 CM/SEC2
BH CV ECHO MEAS - MV DEC TIME: 0.21 SEC
BH CV ECHO MEAS - MV E MAX VEL: 82.1 CM/SEC
BH CV ECHO MEAS - MV E/A: 1.31
BH CV ECHO MEAS - MV MAX PG: 3.9 MMHG
BH CV ECHO MEAS - MV MEAN PG: 1 MMHG
BH CV ECHO MEAS - MV P1/2T: 59.4 MSEC
BH CV ECHO MEAS - MV V2 VTI: 21.9 CM
BH CV ECHO MEAS - MVA(P1/2T): 3.7 CM2
BH CV ECHO MEAS - MVA(VTI): 4.1 CM2
BH CV ECHO MEAS - PA ACC TIME: 0.14 SEC
BH CV ECHO MEAS - PA V2 MAX: 103 CM/SEC
BH CV ECHO MEAS - PULM A REVS DUR: 0.1 SEC
BH CV ECHO MEAS - PULM A REVS VEL: 29.5 CM/SEC
BH CV ECHO MEAS - PULM DIAS VEL: 39.1 CM/SEC
BH CV ECHO MEAS - PULM S/D: 1.1
BH CV ECHO MEAS - PULM SYS VEL: 43 CM/SEC
BH CV ECHO MEAS - RV MAX PG: 2.9 MMHG
BH CV ECHO MEAS - RV V1 MAX: 85.4 CM/SEC
BH CV ECHO MEAS - RV V1 VTI: 16.6 CM
BH CV ECHO MEAS - RVDD: 3.3 CM
BH CV ECHO MEAS - SI(MOD-SP2): 22.6 ML/M2
BH CV ECHO MEAS - SI(MOD-SP4): 25.1 ML/M2
BH CV ECHO MEAS - SV(LVOT): 89.7 ML
BH CV ECHO MEAS - SV(MOD-SP2): 48 ML
BH CV ECHO MEAS - SV(MOD-SP4): 53.4 ML
BH CV ECHO MEAS - TAPSE (>1.6): 2.47 CM
BH CV ECHO MEAS - TR MAX PG: 4.7 MMHG
BH CV ECHO MEAS - TR MAX VEL: 108 CM/SEC
BH CV ECHO MEASUREMENTS AVERAGE E/E' RATIO: 7.86
BH CV REST NUCLEAR ISOTOPE DOSE: 11 MCI
BH CV STRESS BP STAGE 1: NORMAL
BH CV STRESS BP STAGE 2: NORMAL
BH CV STRESS BP STAGE 3: NORMAL
BH CV STRESS DURATION MIN STAGE 1: 3
BH CV STRESS DURATION MIN STAGE 2: 3
BH CV STRESS DURATION MIN STAGE 3: 3
BH CV STRESS DURATION SEC STAGE 1: 0
BH CV STRESS DURATION SEC STAGE 2: 0
BH CV STRESS DURATION SEC STAGE 3: 0
BH CV STRESS GRADE STAGE 1: 10
BH CV STRESS GRADE STAGE 2: 12
BH CV STRESS GRADE STAGE 3: 14
BH CV STRESS HR STAGE 1: 100
BH CV STRESS HR STAGE 2: 114
BH CV STRESS HR STAGE 3: 140
BH CV STRESS METS STAGE 1: 5
BH CV STRESS METS STAGE 2: 7.5
BH CV STRESS METS STAGE 3: 10
BH CV STRESS NUCLEAR ISOTOPE DOSE: 32.8 MCI
BH CV STRESS PROTOCOL 1: NORMAL
BH CV STRESS RECOVERY BP: NORMAL MMHG
BH CV STRESS RECOVERY HR: 90 BPM
BH CV STRESS SPEED STAGE 1: 1.7
BH CV STRESS SPEED STAGE 2: 2.5
BH CV STRESS SPEED STAGE 3: 3.4
BH CV STRESS STAGE 1: 1
BH CV STRESS STAGE 2: 2
BH CV STRESS STAGE 3: 3
BH CV XLRA - RV BASE: 3.7 CM
BH CV XLRA - RV LENGTH: 8.3 CM
BH CV XLRA - RV MID: 3 CM
BH CV XLRA - TDI S': 14.5 CM/SEC
BUN SERPL-MCNC: 19 MG/DL (ref 6–20)
BUN/CREAT SERPL: 20.9 (ref 7–25)
CALCIUM SPEC-SCNC: 9 MG/DL (ref 8.6–10.5)
CHLORIDE SERPL-SCNC: 105 MMOL/L (ref 98–107)
CO2 SERPL-SCNC: 25 MMOL/L (ref 22–29)
CREAT SERPL-MCNC: 0.91 MG/DL (ref 0.76–1.27)
DEPRECATED RDW RBC AUTO: 41.6 FL (ref 37–54)
EGFRCR SERPLBLD CKD-EPI 2021: 98.9 ML/MIN/1.73
EOSINOPHIL # BLD AUTO: 0.3 10*3/MM3 (ref 0–0.4)
EOSINOPHIL NFR BLD AUTO: 7 % (ref 0.3–6.2)
ERYTHROCYTE [DISTWIDTH] IN BLOOD BY AUTOMATED COUNT: 13 % (ref 12.3–15.4)
GLUCOSE SERPL-MCNC: 113 MG/DL (ref 65–99)
HCT VFR BLD AUTO: 42.1 % (ref 37.5–51)
HGB BLD-MCNC: 14.1 G/DL (ref 13–17.7)
LEFT ATRIUM VOLUME INDEX: 16.1 ML/M2
LV EF NUC BP: 74 %
LYMPHOCYTES # BLD AUTO: 2.2 10*3/MM3 (ref 0.7–3.1)
LYMPHOCYTES NFR BLD AUTO: 44.1 % (ref 19.6–45.3)
MAGNESIUM SERPL-MCNC: 2.2 MG/DL (ref 1.6–2.6)
MAXIMAL PREDICTED HEART RATE: 164 BPM
MCH RBC QN AUTO: 30.8 PG (ref 26.6–33)
MCHC RBC AUTO-ENTMCNC: 33.5 G/DL (ref 31.5–35.7)
MCV RBC AUTO: 91.8 FL (ref 79–97)
MONOCYTES # BLD AUTO: 0.5 10*3/MM3 (ref 0.1–0.9)
MONOCYTES NFR BLD AUTO: 9.4 % (ref 5–12)
NEUTROPHILS NFR BLD AUTO: 1.9 10*3/MM3 (ref 1.7–7)
NEUTROPHILS NFR BLD AUTO: 38.5 % (ref 42.7–76)
NRBC BLD AUTO-RTO: 0 /100 WBC (ref 0–0.2)
PERCENT MAX PREDICTED HR: 85.37 %
PLATELET # BLD AUTO: 256 10*3/MM3 (ref 140–450)
PMV BLD AUTO: 7.8 FL (ref 6–12)
POTASSIUM SERPL-SCNC: 3.7 MMOL/L (ref 3.5–5.2)
QT INTERVAL: 372 MS
QTC INTERVAL: 423 MS
RBC # BLD AUTO: 4.58 10*6/MM3 (ref 4.14–5.8)
SINUS: 3.9 CM
SODIUM SERPL-SCNC: 139 MMOL/L (ref 136–145)
STJ: 3.2 CM
STRESS BASELINE BP: NORMAL MMHG
STRESS BASELINE HR: 86 BPM
STRESS PERCENT HR: 100 %
STRESS POST ESTIMATED WORKLOAD: 10.2 METS
STRESS POST EXERCISE DUR MIN: 8 MIN
STRESS POST EXERCISE DUR SEC: 44 SEC
STRESS POST PEAK BP: NORMAL MMHG
STRESS POST PEAK HR: 140 BPM
STRESS TARGET HR: 139 BPM
WBC NRBC COR # BLD AUTO: 4.9 10*3/MM3 (ref 3.4–10.8)

## 2024-01-12 PROCEDURE — 78452 HT MUSCLE IMAGE SPECT MULT: CPT | Performed by: INTERNAL MEDICINE

## 2024-01-12 PROCEDURE — 85025 COMPLETE CBC W/AUTO DIFF WBC: CPT | Performed by: PHYSICIAN ASSISTANT

## 2024-01-12 PROCEDURE — G0378 HOSPITAL OBSERVATION PER HR: HCPCS

## 2024-01-12 PROCEDURE — 83735 ASSAY OF MAGNESIUM: CPT | Performed by: PHYSICIAN ASSISTANT

## 2024-01-12 PROCEDURE — 93306 TTE W/DOPPLER COMPLETE: CPT | Performed by: INTERNAL MEDICINE

## 2024-01-12 PROCEDURE — 78452 HT MUSCLE IMAGE SPECT MULT: CPT

## 2024-01-12 PROCEDURE — 93018 CV STRESS TEST I&R ONLY: CPT | Performed by: INTERNAL MEDICINE

## 2024-01-12 PROCEDURE — 93356 MYOCRD STRAIN IMG SPCKL TRCK: CPT | Performed by: INTERNAL MEDICINE

## 2024-01-12 PROCEDURE — 80048 BASIC METABOLIC PNL TOTAL CA: CPT | Performed by: PHYSICIAN ASSISTANT

## 2024-01-12 PROCEDURE — 93017 CV STRESS TEST TRACING ONLY: CPT

## 2024-01-12 PROCEDURE — 0 TECHNETIUM TETROFOSMIN KIT: Performed by: EMERGENCY MEDICINE

## 2024-01-12 PROCEDURE — 93306 TTE W/DOPPLER COMPLETE: CPT

## 2024-01-12 PROCEDURE — 93356 MYOCRD STRAIN IMG SPCKL TRCK: CPT

## 2024-01-12 PROCEDURE — A9502 TC99M TETROFOSMIN: HCPCS | Performed by: EMERGENCY MEDICINE

## 2024-01-12 RX ORDER — PANTOPRAZOLE SODIUM 40 MG/1
40 TABLET, DELAYED RELEASE ORAL DAILY
Qty: 30 TABLET | Refills: 0 | Status: SHIPPED | OUTPATIENT
Start: 2024-01-12 | End: 2024-02-11

## 2024-01-12 RX ADMIN — TETROFOSMIN 1 DOSE: 1.38 INJECTION, POWDER, LYOPHILIZED, FOR SOLUTION INTRAVENOUS at 08:27

## 2024-01-12 RX ADMIN — TETROFOSMIN 1 DOSE: 1.38 INJECTION, POWDER, LYOPHILIZED, FOR SOLUTION INTRAVENOUS at 06:59

## 2024-01-12 RX ADMIN — Medication 10 ML: at 09:35

## 2024-01-12 NOTE — DISCHARGE SUMMARY
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  States it is mild there is no neck arm jaw pain or shortness of breath dizziness sweating.  Nothing really seems to make it better or worse.  He denies any recent long car ride plane ride immobilization for travels or leg pain or swelling no recent illness flus viruses or vaccinations.  No history of heart disease and no early family history of heart disease.  No history of sudden death or clotting disorders in the family.     1/11/2024 (postobservation admission):  Patient confirms the HPI noted above including some initial momentary episodes of sharp chest pain located on the left side of his chest which seem to be related to some musculoskeletal movements though were not reproducible with repetitive motions and lasted only seconds before resolving spontaneously.  Patient initially took some ibuprofen however subsequently developed a more constant nagging left-sided chest pressure.  He notes that the pain would \"catch his breath\" when it was sharp and severe initially but notes no other dyspnea, palpitations, diaphoresis, peripheral edema, cough or fever.  Family history is notable for both maternal and paternal grandfathers who had CAD.    1/12/2024:  Patient reports he did generally well overnight though did have some trouble resting.  He notes that after getting up on the morning following admission he had some recurrence of momentary sharp pain while yawning on but these resolved spontaneously              Past Medical History:   Diagnosis Date    Hyperlipidemia     " Hypertension      Past Surgical History:   Procedure Laterality Date    HERNIA REPAIR       History reviewed. No pertinent family history.  Social History     Tobacco Use    Smoking status: Former     Passive exposure: Never    Smokeless tobacco: Never    Tobacco comments:     quit 2003   Vaping Use    Vaping Use: Every day    Substances: Nicotine, CBD    Devices: Disposable   Substance Use Topics    Alcohol use: Yes     Comment: social    Drug use: No     Medications Prior to Admission   Medication Sig Dispense Refill Last Dose    atorvastatin (LIPITOR) 20 MG tablet Take 1 tablet by mouth Every Night.       fenofibrate (TRICOR) 145 MG tablet Take 1 tablet by mouth every night at bedtime.       lisinopril (PRINIVIL,ZESTRIL) 20 MG tablet Take 1 tablet by mouth Daily.       tamsulosin (FLOMAX) 0.4 MG capsule 24 hr capsule Take 1 capsule by mouth every night at bedtime.        Allergies:  Patient has no known allergies.    Immunization History   Administered Date(s) Administered    COVID-19 (PFIZER) Purple Cap Monovalent 03/12/2021, 04/02/2021, 01/10/2022    Fluzone Quad >6mos (Multi-dose) 10/25/2019    Tdap 10/25/2019           REVIEW OF SYSTEMS:    Constitutional: Negative.   HENT: Negative.     Eyes: Negative.    Cardiovascular:  Positive for chest pain.   Respiratory: Negative.     Skin: Negative.    Musculoskeletal: Negative.    Gastrointestinal: Negative.    Genitourinary: Negative.    Neurological: Negative.    Psychiatric/Behavioral: Negative.    Vital Signs  Temp:  [98.3 °F (36.8 °C)-98.4 °F (36.9 °C)] 98.3 °F (36.8 °C)  Heart Rate:  [60-74] 68  Resp:  [14-20] 20  BP: (125-152)/(77-99) 125/85          Physical Exam:  Physical Exam  Vitals reviewed.   Constitutional:       General: He is not in acute distress.     Appearance: Normal appearance. He is normal weight. He is not ill-appearing, toxic-appearing or diaphoretic.   HENT:      Head: Normocephalic.      Right Ear: External ear normal.      Left Ear:  External ear normal.      Nose: Nose normal.      Mouth/Throat:      Mouth: Mucous membranes are moist.   Eyes:      Extraocular Movements: Extraocular movements intact.   Cardiovascular:      Rate and Rhythm: Normal rate and regular rhythm.      Pulses: Normal pulses.      Heart sounds: Normal heart sounds.   Pulmonary:      Effort: Pulmonary effort is normal.      Breath sounds: Normal breath sounds.   Abdominal:      General: Bowel sounds are normal.      Palpations: Abdomen is soft.      Tenderness: There is no abdominal tenderness.   Musculoskeletal:         General: Normal range of motion.      Cervical back: Normal range of motion.      Right lower leg: No edema.      Left lower leg: No edema.   Skin:     General: Skin is warm and dry.      Capillary Refill: Capillary refill takes less than 2 seconds.   Neurological:      General: No focal deficit present.      Mental Status: He is alert and oriented to person, place, and time.   Psychiatric:         Mood and Affect: Mood normal.         Behavior: Behavior normal.         Thought Content: Thought content normal.         Judgment: Judgment normal.           Emotional Behavior:   Normal   Debilities:  None  Results Review:    I reviewed the patient's new clinical results.  Lab Results (most recent)       Procedure Component Value Units Date/Time    High Sensitivity Troponin T 2Hr [[id number]]  (Normal) Collected: 01/11/24 1537    Specimen: Blood Updated: 01/11/24 1605     HS Troponin T 13 ng/L      Troponin T Delta 0 ng/L     Narrative:      High Sensitive Troponin T Reference Range:  <14.0 ng/L- Negative Female for AMI  <22.0 ng/L- Negative Male for AMI  >=14 - Abnormal Female indicating possible myocardial injury.  >=22 - Abnormal Male indicating possible myocardial injury.   Clinicians would have to utilize clinical acumen, EKG, Troponin, and serial changes to determine if it is an Acute Myocardial Infarction or myocardial injury due to an underlying chronic  condition.         Comprehensive Metabolic Panel [[id number]]  (Abnormal) Collected: 01/11/24 1336    Specimen: Blood Updated: 01/11/24 1418     Glucose 102 mg/dL      BUN 16 mg/dL      Creatinine 0.98 mg/dL      Sodium 137 mmol/L      Potassium 3.8 mmol/L      Comment: Slight hemolysis detected by analyzer. Result may be falsely elevated.        Chloride 103 mmol/L      CO2 23.0 mmol/L      Calcium 9.5 mg/dL      Total Protein 7.4 g/dL      Albumin 4.7 g/dL      ALT (SGPT) 48 U/L      AST (SGOT) 35 U/L      Alkaline Phosphatase 63 U/L      Total Bilirubin 0.6 mg/dL      Globulin 2.7 gm/dL      A/G Ratio 1.7 g/dL      BUN/Creatinine Ratio 16.3     Anion Gap 11.0 mmol/L      eGFR 91.1 mL/min/1.73     Narrative:      GFR Normal >60  Chronic Kidney Disease <60  Kidney Failure <15      High Sensitivity Troponin T [[id number]]  (Normal) Collected: 01/11/24 1336    Specimen: Blood Updated: 01/11/24 1418     HS Troponin T 13 ng/L     Narrative:      High Sensitive Troponin T Reference Range:  <14.0 ng/L- Negative Female for AMI  <22.0 ng/L- Negative Male for AMI  >=14 - Abnormal Female indicating possible myocardial injury.  >=22 - Abnormal Male indicating possible myocardial injury.   Clinicians would have to utilize clinical acumen, EKG, Troponin, and serial changes to determine if it is an Acute Myocardial Infarction or myocardial injury due to an underlying chronic condition.         CBC & Differential [[id number]]  (Normal) Collected: 01/11/24 1336    Specimen: Blood Updated: 01/11/24 1356    Narrative:      The following orders were created for panel order CBC & Differential.  Procedure                               Abnormality         Status                     ---------                               -----------         ------                     CBC Auto Differential[[id number]]        Normal              Final result                 Please view results for these tests on the individual orders.    CBC Auto Differential  [[id number]]  (Normal) Collected: 01/11/24 1336    Specimen: Blood Updated: 01/11/24 1356     WBC 4.70 10*3/mm3      RBC 4.66 10*6/mm3      Hemoglobin 14.8 g/dL      Hematocrit 43.2 %      MCV 92.8 fL      MCH 31.9 pg      MCHC 34.3 g/dL      RDW 12.9 %      RDW-SD 41.1 fl      MPV 7.8 fL      Platelets 271 10*3/mm3      Neutrophil % 44.6 %      Lymphocyte % 39.1 %      Monocyte % 9.8 %      Eosinophil % 5.5 %      Basophil % 1.0 %      Neutrophils, Absolute 2.10 10*3/mm3      Lymphocytes, Absolute 1.80 10*3/mm3      Monocytes, Absolute 0.50 10*3/mm3      Eosinophils, Absolute 0.30 10*3/mm3      Basophils, Absolute 0.00 10*3/mm3      nRBC 0.0 /100 WBC             Imaging Results (Most Recent)       Procedure Component Value Units Date/Time    XR Chest 2 View [[id number]] Collected: 01/11/24 1408     Updated: 01/11/24 1410    Narrative:      XR CHEST 2 VW    Date of Exam: 1/11/2024 1:49 PM EST    Indication: Chest pain    Comparison: None available.    Findings:  Heart and pulmonary vessels appear within normal limits. There are low lung volumes with crowding of vascular markings. There are no definite acute infiltrates or effusions. There is mild elevation of the right diaphragm.      Impression:      Impression:  Poor inspiration. No acute process.      Electronically Signed: Analisa Hannon MD    1/11/2024 2:08 PM EST    Workstation ID: [id number]          reviewed    ECG/EMG Results (most recent)       Procedure Component Value Units Date/Time    ECG 12 Lead Chest Pain [[id number]] Collected: 01/11/24 1308     Updated: 01/12/24 0616     QT Interval 372 ms      QTC Interval 423 ms     Narrative:      HEART RATE= 78  bpm  RR Interval= 772  ms  VA Interval= 192  ms  P Horizontal Axis= -6  deg  P Front Axis= 38  deg  QRSD Interval= 104  ms  QT Interval= 372  ms  QTcB= 423  ms  QRS Axis= -19  deg  T Wave Axis= 37  deg  - ABNORMAL ECG -  Sinus rhythm  No previous ECG available for comparison  Electronically Signed By:  Umair Reynolds ([facility]) 12-Jan-2024 06:16:38  Date and Time of Study: 2024-01-11 13:08:21    Adult Transthoracic Echo Complete W/ Cont if Necessary Per Protocol [[id number]] Resulted: 01/12/24 0833     Updated: 01/12/24 0910          reviewed            Microbiology Results (last 10 days)       ** No results found for the last 240 hours. **            Assessment & Plan     Chest pain     Chest pain  Lab Results   Component Value Date    TROPONINT 13 01/11/2024    TROPONINT 13 01/11/2024   -Lipid panel showed total cholesterol of 130 with an LDL of 78 and HDL of 34  -Chest X-ray: Showed no acute process but with poor inspiration noted  -EKG: Sinus rhythm at 78 without obvious acute changes or ectopy with a QTc of 423 ms  -In the ED pt given 325 mg aspirin  -Stress Test showed hyperdynamic EF greater than 70% with normal myocardial perfusion imaging without evidence of ischemia consistent with a low risk study  -Echocardiogram  -Telemetry  -Continue statin    Hypertension  -Well controlled   BP Readings from Last 1 Encounters:   01/12/24 125/85   - Continue lisinopril  - Monitor while admitted    Hyperlipidemia  -Statin    BPH  -Flomax      I discussed the patients findings and my recommendations with patient and nursing staff.     Discharge Diagnosis:      Chest pain      Hospital Course  Patient is a 54 y.o. male presented with chest pain with an HPI noted above.  Serial troponins were assessed and found to be stable at 13 with lipid panel showing total cholesterol of 130 with an LDL of 78 and HDL of 34.  Chest x-ray showed no acute process the radiologist did report low lung volumes likely secondary to poor inspiration.  EKG showed sinus rhythm at 78 without obvious acute changes and he was continued on telemetry without significant events reported.  Patient received 325 mg aspirin in the ED.  Stress testing was performed on 1/12/2023 and was found to be consistent with a low risk study.  Echocardiogram was also  obtained.  Trial of pantoprazole be prescribed at discharge for possible GI source of patient's symptoms.  At this time patient is felt to be in good condition for discharge with close follow-up with his PCP as well as cardiology on an outpatient basis.  His full testing/results and plan were discussed with patient along with concerning/alarm symptoms for which to call 911/return to the ED.  All questions were answered and he verbalizes his understanding and agreement.    Past Medical History:     Past Medical History:   Diagnosis Date    Hyperlipidemia     Hypertension        Past Surgical History:     Past Surgical History:   Procedure Laterality Date    HERNIA REPAIR         Social History:   Social History     Socioeconomic History    Marital status:    Tobacco Use    Smoking status: Former     Passive exposure: Never    Smokeless tobacco: Never    Tobacco comments:     quit 2003   Vaping Use    Vaping Use: Every day    Substances: Nicotine, CBD    Devices: Disposable   Substance and Sexual Activity    Alcohol use: Yes     Comment: social    Drug use: No    Sexual activity: Yes     Partners: Female       Procedures Performed         Consults:   Consults       No orders found for last 30 day(s).            Condition on Discharge:     Stable    Discharge Disposition  Home or Self Care    Discharge Medications     Discharge Medications        New Medications        Instructions Start Date   pantoprazole 40 MG EC tablet  Commonly known as: PROTONIX   40 mg, Oral, Daily             Continue These Medications        Instructions Start Date   atorvastatin 20 MG tablet  Commonly known as: LIPITOR   20 mg, Oral, Nightly      fenofibrate 145 MG tablet  Commonly known as: TRICOR   145 mg, Oral, Every Night at Bedtime      lisinopril 20 MG tablet  Commonly known as: PRINIVIL,ZESTRIL   20 mg, Oral, Daily      tamsulosin 0.4 MG capsule 24 hr capsule  Commonly known as: FLOMAX   1 capsule, Oral, Every Night at Bedtime                Discharge Diet:     Activity at Discharge:     Follow-up Appointments  Future Appointments   Date Time Provider Department Center   1/12/2024 10:10 AM [facility] ROOM [facility]  [facility]     Additional Instructions for the Follow-ups that You Need to Schedule       Discharge Follow-up with PCP   As directed       Currently Documented PCP:    Michele Alex MD    PCP Phone Number:    [phone]     Follow Up Details: 5 to 7 days        Discharge Follow-up with Specified Provider: Cardiology; 2 Months   As directed      To: Cardiology   Follow Up: 2 Months                Test Results Pending at Discharge       Risk for Readmission (LACE) Score: 1 (1/12/2024  6:00 AM)      Greater than 30 minutes spent in discharge activities for this patient    Antonio Jorgensen PA-C  01/12/24  09:52 EST

## 2024-01-12 NOTE — PLAN OF CARE
Goal Outcome Evaluation:  Plan of Care Reviewed With: patient        Progress: improving  Outcome Evaluation: Patient had a normal stress myoview per Dr. Longoria. pt will be discharged home with a prescription for protonix. continue to monitor

## 2024-01-12 NOTE — CASE MANAGEMENT/SOCIAL WORK
Discharge Planning Assessment  [facility]     Patient Name: Pedro Meyers  MRN: [id number]  Today's Date: 1/12/2024    Admit Date: 1/11/2024    Plan: Routine home   Discharge Needs Assessment       Row Name 01/12/24 1111       Living Environment    People in Home spouse    Name(s) of People in Home debra Squires    Current Living Arrangements home    Potentially Unsafe Housing Conditions none    Primary Care Provided by self    Provides Primary Care For no one    Family Caregiver if Needed spouse    Family Caregiver Names Tavia    Quality of Family Relationships helpful;involved;supportive    Able to Return to Prior Arrangements yes       Resource/Environmental Concerns    Resource/Environmental Concerns none    Transportation Concerns none       Transition Planning    Patient/Family Anticipates Transition to home with family    Patient/Family Anticipated Services at Transition none    Transportation Anticipated car, drives self;family or friend will provide       Discharge Needs Assessment    Readmission Within the Last 30 Days no previous admission in last 30 days    Equipment Currently Used at Home bp cuff    Concerns to be Addressed denies needs/concerns at this time;no discharge needs identified    Anticipated Changes Related to Illness none    Equipment Needed After Discharge none                   Discharge Plan       Row Name 01/12/24 1111       Plan    Plan Routine home    Plan Comments CM met with patient at the bedside. Confirmed PCP, insurance, and pharmacy. Patient denies any difficulty affording medications. Patient is not current with any HHC/OPPT/OT services. Patient lives at home with wife, is IADLS, and drives. DC Barriers: myoview negative. DC orders in.                  Continued Care and Services - Admitted Since 1/11/2024    Coordination has not been started for this encounter.       Expected Discharge Date and Time       Expected Discharge Date Expected Discharge Time    Jan 12, 2024             Demographic Summary       Row Name 01/12/24 1110       General Information    Admission Type observation    Arrived From emergency department    Referral Source admission list    Reason for Consult discharge planning    Preferred Language English       Contact Information    Permission Granted to Share Info With     Contact Information Obtained for                    Functional Status       Row Name 01/12/24 1110       Functional Status    Usual Activity Tolerance good    Current Activity Tolerance good       Functional Status, IADL    Medications independent    Meal Preparation independent    Housekeeping independent    Laundry independent    Shopping independent       Mental Status    General Appearance WDL WDL       Mental Status Summary    Recent Changes in Mental Status/Cognitive Functioning no changes                Henry Nelson RN     Cell number [phone]  Office number [phone]

## 2024-01-12 NOTE — PLAN OF CARE
Problem: Adult Inpatient Plan of Care  Goal: Plan of Care Review  Outcome: Ongoing, Progressing  Goal: Patient-Specific Goal (Individualized)  Outcome: Ongoing, Progressing  Goal: Absence of Hospital-Acquired Illness or Injury  Outcome: Ongoing, Progressing  Intervention: Identify and Manage Fall Risk  Recent Flowsheet Documentation  Taken 1/11/2024 2200 by Sabrina Crowley RN  Safety Promotion/Fall Prevention: safety round/check completed  Taken 1/11/2024 2005 by Sabrina Crowley RN  Safety Promotion/Fall Prevention:   safety round/check completed   room organization consistent   nonskid shoes/slippers when out of bed   clutter free environment maintained   assistive device/personal items within reach  Intervention: Prevent Skin Injury  Recent Flowsheet Documentation  Taken 1/11/2024 2005 by Sabrina Crowley RN  Body Position: supine  Intervention: Prevent and Manage VTE (Venous Thromboembolism) Risk  Recent Flowsheet Documentation  Taken 1/11/2024 2005 by Sabrina Crowley RN  Activity Management:   ambulated to bathroom   ambulated in room   ambulated outside room   up ad felix  Intervention: Prevent Infection  Recent Flowsheet Documentation  Taken 1/11/2024 2005 by Sabrina Crowley, RN  Infection Prevention:   single patient room provided   hand hygiene promoted   rest/sleep promoted  Goal: Optimal Comfort and Wellbeing  Outcome: Ongoing, Progressing  Intervention: Provide Person-Centered Care  Recent Flowsheet Documentation  Taken 1/11/2024 2005 by Sabrina Crowley RN  Trust Relationship/Rapport:   care explained   choices provided   questions answered   questions encouraged   thoughts/feelings acknowledged  Goal: Readiness for Transition of Care  Outcome: Ongoing, Progressing     Problem: Hypertension Comorbidity  Goal: Blood Pressure in Desired Range  Outcome: Ongoing, Progressing  Intervention: Maintain Blood Pressure Management  Recent Flowsheet Documentation  Taken 1/11/2024  2005 by Sabrina Crowley, RN  Medication Review/Management: medications reviewed     Problem: Chest Pain  Goal: Resolution of Chest Pain Symptoms  Outcome: Ongoing, Progressing     Problem: Fatigue  Goal: Improved Activity Tolerance  Outcome: Ongoing, Progressing  Intervention: Promote Improved Energy  Recent Flowsheet Documentation  Taken 1/11/2024 2005 by Sabrina Crowley, RN  Activity Management:   ambulated to bathroom   ambulated in room   ambulated outside room   up ad felix   Goal Outcome Evaluation:   Patient resting quietly in bed at this time, denies pain or distress at this time. Patient denies chest pain, resting quietly in bed, scheduled for Myoview in AM, safety maintained call light in reach

## 2024-01-15 NOTE — CASE MANAGEMENT/SOCIAL WORK
Case Management Discharge Note      Final Note: Routine home         Selected Continued Care - Discharged on 1/12/2024 Admission date: 1/11/2024 - Discharge disposition: Home or Self Care         Transportation Services  Private: Car    Final Discharge Disposition Code: 01 - home or self-care